# Patient Record
Sex: MALE | Race: WHITE | NOT HISPANIC OR LATINO | Employment: FULL TIME | URBAN - METROPOLITAN AREA
[De-identification: names, ages, dates, MRNs, and addresses within clinical notes are randomized per-mention and may not be internally consistent; named-entity substitution may affect disease eponyms.]

---

## 2023-02-15 ENCOUNTER — TELEPHONE (OUTPATIENT)
Dept: PSYCHIATRY | Facility: CLINIC | Age: 38
End: 2023-02-15

## 2023-02-15 NOTE — TELEPHONE ENCOUNTER
Behavioral Health Outpatient Intake Questions    Referred By   : self    Please advise interviewee that they need to answer all questions truthfully to allow for best care, and any misrepresentations of information may affect their ability to be seen at this clinic   => Was this discussed? Yes     If Minor Child (under age 25)    Who is/are the legal guardian(s) of the child? Is there a custody agreement? No     • If "YES"- Custody orders must be obtained prior to scheduling the first appointment  • In addition, Consent to Treatment must be signed by all legal guardians prior to scheduling the first appointment    • If "NO"- Consent to Treatment must be signed by all legal guardians prior to scheduling the first appointment    Behavioral Health Outpatient Intake History -     Presenting Problem (in patient's own words): Grief, wants help processing loss of brother  Patient is getting  in a few weeks  Are there any communication barriers for this patient? No                                               If yes, please describe barriers: no  • If there is a unique situation, please refer to [address] [place] [id number] for final determination  Are you taking any psychiatric medications? No   •   If "YES" -What are they no   •   If "YES" -Who prescribes? Has the Patient previously received outpatient Talk Therapy or Medication Management from [facility]     •    If "YES"- When, Where and with Whom? •    If "NO" -Has Patient received these services elsewhere? •   If "YES" -When, Where, and with Whom? Has the Patient abused alcohol or other substances in the last 6 months ? No  No concerns of substance abuse are reported  •  If "YES" -What substance, How much, How often? •  If illegal substance: Refer to [facility] (for BRITTANY) or [facility]    •  If Alcohol in excess of 10 drinks per week:  Refer to [facility] (for BRITTANY) or [address] [place] [address] History-     Is this treatment court ordered? No   • If "Yes"- refer to [address] [place] [address] for final determination  Has the Patient been convicted of a felony? No  •  If "Yes" -When, What? • Talk Therapy : Send to [address] [place] [address] for final determination   • Med Management: Send to Dr Leif Chamorro for final determination     ACCEPTED as a patient Yes  • If "Yes" Appointment Date: 2/28/23 @ 3:00 PM with Mariana Dukes    Referred Elsewhere? No  • If “Yes” - (Where? Ex: [facility], [facility], [address] [place] [address], etc )       Name of Insurance Co: [address] [place] [address] ID# [id number]  Insurance Phone #  If ins is primary or secondary? Primary  If patient is a minor, parents information such as Name, D  O B of guarantor

## 2023-02-28 ENCOUNTER — SOCIAL WORK (OUTPATIENT)
Dept: BEHAVIORAL/MENTAL HEALTH CLINIC | Facility: CLINIC | Age: 38
End: 2023-02-28

## 2023-02-28 DIAGNOSIS — F43.20 ADJUSTMENT DISORDER, UNSPECIFIED TYPE: Primary | ICD-10-CM

## 2023-02-28 NOTE — PSYCH
Assessment/Plan:      Diagnoses and all orders for this visit:    Adjustment disorder, unspecified type          Subjective:      Patient ID: Gustavo Gilmore is a 44 y o   male that came to into treatment due to his brother overdosing about 4 week ago  Pt reports that he is getting  in 11 days and he feels that he want to get ahead of the grief he is feeling  Pt reports that his fiance notice when he withdraws which makes her upset, we argue about how I am feeling but it is nothing major  Pt reported that he has 7 dogs and him and his fiance have busy jobs  Pt reports that he doesn't have anger issues but just having a hard time coping with the loss of his brother  Pt reported that his friend committed suicide in 2009 and he did get a chance to process it with other friends  Pt reported that his fiance have a gun in safe and he has a gun with no shells in it but he doesn't feel unsafe at all with having weapons  Pt denies S/I and H/I and reports that his fiance is a  and they are working to get people subpoenaed to get information regarding the lost of his brother  HPI:     Pre-morbid level of function and History of Present Illness: N/A  Previous Psychiatric/psychological treatment/year: N/A  Current Psychiatrist/Therapist: Mariana  Outpatient and/or Partial and Other Community Resources Used ([facility], [facility], [facility]): N/A      Problem Assessment:     SOCIAL/VOCATION:  Family Constellation (include parents, relationship with each and pertinent Psych/Medical History):     No family history on file  Mother: Leobardo Mendiola, mom is 79 yrs young, we have a really good relationship we talk daily, see each other, every other week  Spouse: Nikki Lao is my [de-identified] 29yrs old, we are close met online 2 years ago met on [employer]  Father: Wilfrido Merino, is my father he is 70 yrs young, my relationship with dad is very close  Children: N/A   Sibling: Marcial Zaidi, my sister, is 43 yrs old, shane relationship, can be very difficult   She has anger issues that may have never been addressed  Sibling:  Bree Gardner, my brother just turned 39 yrs old, he was [facility]   Children: N/A  Other: NA    Oliver Waldron relates best to my dad  he lives with fiance  he does not live alone  Domestic Violence: No past history of domestic violence    Additional Comments related to family/relationships/peer support: Pt reports that his fiance is his greatest support    School or Work History (strengths/limitations/needs): Some college experience [email or web address] 2 years    Her highest grade level achieved was  Second year in [address] [place] [address] history includes N/A    Financial status includes Pt stated that his fiances are stable     LEISURE ASSESSMENT (Include past and present hobbies/interests and level of involvement (Ex: Group/Club Affiliations): fishing, weight lifting  his primary language is [place]  Preferred language is [place]  Ethnic considerations are   Religions affiliations and level of involvement agonistic  Does spirituality help you cope?  Yes     FUNCTIONAL STATUS: There has been a recent change in Oliver Waldron ability to do the following: N/A    Level of Assistance Needed/By Whom?: N/A    Oliver Waldron learns best by  picture    SUBSTANCE ABUSE ASSESSMENT: no substance abuse    Substance/Route/Age/Amount/Frequency/Last Use: N/A    DETOX HISTORY: N/A    Previous detox/rehab treatment: N/A    HEALTH ASSESSMENT: no referral to PCP needed    LEGAL: No Mental Health Advance Directive or Power of  on file    Prenatal History: N/A    Delivery History: born by  section    Developmental Milestones: toilet trained at 1years old  Temperament as an infant was normal     Temperament as a toddler was normal   Temperament at school age was normal   Temperament as a teenager was normal     Risk Assessment:   The following ratings are based on my N/A    Risk of Harm to Self:   Demographic risk factors include   Historical Risk Factors include a relative or close friend who  by suicide  Recent Specific Risk Factors include N/A  Additional Factors for a Child or Adolescent gender: male (more likely to succeed)    Risk of Harm to Others:   Demographic Risk Factors include lower intelligence  and N/A  Historical Risk Factors include N/A  Recent Specific Risk Factors include N/A    Access to Weapons:   Mare Forrester has access to the following weapons: one gun is locked up and the other has no shells in it   The following steps have been taken to ensure weapons are properly secured: guns are locked up    Based on the above information, the client presents the following risk of harm to self or others:  low    The following interventions are recommended:   no intervention changes    Notes regarding this Risk Assessment: N/A        Review Of Systems:     Mood Normal   Behavior Normal    Thought Content Normal   General Normal    Personality Normal   Other Psych Symptoms Normal   Constitutional As Noted in HPI   ENT As Noted in HPI   Cardiovascular As Noted in HPI   Respiratory As Noted in HPI   Gastrointestinal As Noted in HPI   Genitourinary As Noted in HPI   Musculoskeletal As Noted in HPI   Integumentary As Noted in HPI   Neurological As Noted in HPI   Endocrine Normal          Mental status:  Appearance calm and cooperative    Mood mood appropriate   Affect affect appropriate    Speech a normal rate   Thought Processes normal thought processes   Hallucinations no hallucinations present    Thought Content no delusions   Abnormal Thoughts no suicidal thoughts    Orientation  oriented to person   Remote Memory short term memory intact   Attention Span concentration intact   Intellect Appears to be of Average Intelligence   Fund of Knowledge displays adequate knowledge of current events   Insight Insight intact   Judgement judgment was intact   Muscle Strength Normal gait    Language no difficulty naming common objects   Pain none   Pain Scale 4     Visit start and stop times:    02/28/23  Start Time: 1500  Stop Time: 1600  Total Visit Time: 60 minutes

## 2024-01-01 ENCOUNTER — DOCUMENTATION (OUTPATIENT)
Dept: BEHAVIORAL/MENTAL HEALTH CLINIC | Facility: CLINIC | Age: 39
End: 2024-01-01

## 2024-01-01 NOTE — PROGRESS NOTES
Psychotherapy Discharge Summary    Preferred Name: Jose Nichols  YOB: 1985    Admission date to psychotherapy: 02-    Referred by: self    Presenting Problem: Adjustment disorder    Course of treatment included : individual therapy     Progress/Outcome of Treatment Goals (brief summary of course of treatment) Pt was not seen in treatment long, wasn't even able to complete treatment plan    Treatment Complications (if any): NA    Treatment Progress:  uncertain    Current [facility] Psychiatric Provider: NA    Discharge Medications include: uncertain    Discharge Date: 01-    Discharge Diagnosis: adjustment disorder, unspecified    Criteria for Discharge:  did not stay in treatment    Aftercare recommendations include (include specific referral names and phone numbers, if appropriate): NA    Prognosis:  uncertain